# Patient Record
Sex: FEMALE | Race: WHITE | HISPANIC OR LATINO | Employment: UNEMPLOYED | URBAN - METROPOLITAN AREA
[De-identification: names, ages, dates, MRNs, and addresses within clinical notes are randomized per-mention and may not be internally consistent; named-entity substitution may affect disease eponyms.]

---

## 2018-01-21 ENCOUNTER — HOSPITAL ENCOUNTER (EMERGENCY)
Facility: HOSPITAL | Age: 38
Discharge: HOME | End: 2018-01-21
Attending: EMERGENCY MEDICINE

## 2018-01-21 VITALS
RESPIRATION RATE: 18 BRPM | HEART RATE: 62 BPM | DIASTOLIC BLOOD PRESSURE: 64 MMHG | WEIGHT: 143 LBS | HEIGHT: 66 IN | OXYGEN SATURATION: 99 % | SYSTOLIC BLOOD PRESSURE: 113 MMHG | TEMPERATURE: 98 F | BODY MASS INDEX: 22.98 KG/M2

## 2018-01-21 DIAGNOSIS — R09.81 NASAL CONGESTION: Primary | ICD-10-CM

## 2018-01-21 DIAGNOSIS — J06.9 VIRAL URI WITH COUGH: ICD-10-CM

## 2018-01-21 LAB
B-HCG UR QL: NEGATIVE
CTP QC/QA: YES
FLUAV AG SPEC QL IA: NEGATIVE
FLUBV AG SPEC QL IA: NEGATIVE
SPECIMEN SOURCE: NORMAL

## 2018-01-21 PROCEDURE — 63600175 PHARM REV CODE 636 W HCPCS: Performed by: NURSE PRACTITIONER

## 2018-01-21 PROCEDURE — 87400 INFLUENZA A/B EACH AG IA: CPT | Mod: 59

## 2018-01-21 PROCEDURE — 99284 EMERGENCY DEPT VISIT MOD MDM: CPT | Mod: 25

## 2018-01-21 PROCEDURE — 81025 URINE PREGNANCY TEST: CPT | Performed by: EMERGENCY MEDICINE

## 2018-01-21 PROCEDURE — 96372 THER/PROPH/DIAG INJ SC/IM: CPT

## 2018-01-21 RX ORDER — ALBUTEROL SULFATE 90 UG/1
1-2 AEROSOL, METERED RESPIRATORY (INHALATION) EVERY 6 HOURS PRN
Qty: 1 INHALER | Refills: 0 | Status: SHIPPED | OUTPATIENT
Start: 2018-01-21 | End: 2019-01-21

## 2018-01-21 RX ORDER — AZITHROMYCIN 250 MG/1
250 TABLET, FILM COATED ORAL DAILY
Qty: 6 TABLET | Refills: 0 | Status: SHIPPED | OUTPATIENT
Start: 2018-01-21

## 2018-01-21 RX ORDER — METHYLPREDNISOLONE SOD SUCC 125 MG
125 VIAL (EA) INJECTION
Status: COMPLETED | OUTPATIENT
Start: 2018-01-21 | End: 2018-01-21

## 2018-01-21 RX ORDER — PREDNISONE 20 MG/1
40 TABLET ORAL DAILY
Qty: 10 TABLET | Refills: 0 | Status: SHIPPED | OUTPATIENT
Start: 2018-01-21 | End: 2018-01-26

## 2018-01-21 RX ADMIN — METHYLPREDNISOLONE SODIUM SUCCINATE 125 MG: 125 INJECTION, POWDER, FOR SOLUTION INTRAMUSCULAR; INTRAVENOUS at 10:01

## 2018-01-22 NOTE — ED TRIAGE NOTES
Presents to ED with fever, headache, productive cough, body aches, nasal congestion and bilateral ear pain that started this past Wednesday. Reports left sided chest pain x 1 month. 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 Denies SOB at this time.

## 2018-01-22 NOTE — ED PROVIDER NOTES
Encounter Date: 1/21/2018    SCRIBE #1 NOTE: I, Nichol Harmon, am scribing for, and in the presence of,  Opal Hart NP. I have scribed the following portions of the note - Other sections scribed: ROS, HPI.       History     Chief Complaint   Patient presents with    Nasal Congestion     HA, nasal congestion, sinus pressure, fever, chills, sore throat and body aches since Wednesday; took Mucinex at 2pm today     CC: Nasal Congestion    HPI: Patient is a 37 y.o. F with a past medical history of Bronchitis who presents to the ED for evaluation of a 5-day history of URI symptoms, sinus congestion with yellow/green nasal discharge, sore throat, cough, fever, and myalgias. Patient states that her sinus congestion is the most severe symptom and is causing a headache. She attempted treatment with Mucinex with no relief. Patient states that she has been prescribed an Albuterol inhaler in the past with moderate improvement of symptoms. Patient denies emesis, diarrhea, and/or fever.      The history is provided by the patient. No  was used.     Review of patient's allergies indicates:  No Known Allergies  Past Medical History:   Diagnosis Date    Bronchitis      Past Surgical History:   Procedure Laterality Date    VAGINAL DELIVERY      x3     History reviewed. No pertinent family history.  Social History   Substance Use Topics    Smoking status: Never Smoker    Smokeless tobacco: Not on file    Alcohol use No     Review of Systems   Constitutional: Positive for fever.   HENT: Positive for congestion (nasal congestion with yellow/green discharge) and sore throat.    Eyes: Negative for redness.   Respiratory: Positive for cough.    Gastrointestinal: Negative for diarrhea and vomiting.   Genitourinary: Negative for dysuria.   Musculoskeletal: Positive for myalgias.   Skin: Negative for rash.   Neurological: Positive for headaches.       Physical Exam     Initial Vitals [01/21/18 2032]   BP Pulse Resp  Temp SpO2   105/69 82 16 98.1 °F (36.7 °C) 95 %      MAP       81         Physical Exam    Nursing note and vitals reviewed.  Constitutional: Vital signs are normal. She appears well-developed and well-nourished. She is not diaphoretic. She is active and cooperative.  Non-toxic appearance. She appears ill. No distress.   HENT:   Right Ear: Tympanic membrane, external ear and ear canal normal.   Left Ear: Tympanic membrane, external ear and ear canal normal.   Nose: Mucosal edema and rhinorrhea present. No nasal septal hematoma. Right sinus exhibits maxillary sinus tenderness and frontal sinus tenderness. Left sinus exhibits maxillary sinus tenderness and frontal sinus tenderness.   Mouth/Throat: Uvula is midline and mucous membranes are normal. No oral lesions. No trismus in the jaw. No uvula swelling. Posterior oropharyngeal erythema present. No oropharyngeal exudate, posterior oropharyngeal edema or tonsillar abscesses.   No tonsillar enlargement, exudates, abscess, trismus, drooling, cervical lymphadenopathy, voice changes.   Eyes: Conjunctivae and EOM are normal. Pupils are equal, round, and reactive to light.   Neck: Normal range of motion. Neck supple.   No neck meningismus   Cardiovascular: Normal heart sounds. Exam reveals no gallop and no friction rub.    No murmur heard.  Pulmonary/Chest: Effort normal and breath sounds normal. No accessory muscle usage. No tachypnea and no bradypnea. No respiratory distress. She has no wheezes. She has no rhonchi. She has no rales.   Abdominal: Soft.   Lymphadenopathy:     She has no cervical adenopathy.   Neurological: She is alert and oriented to person, place, and time.   Skin: Skin is warm and dry. Capillary refill takes less than 2 seconds.   Psychiatric: She has a normal mood and affect.         ED Course   Procedures  Labs Reviewed   INFLUENZA A AND B ANTIGEN   POCT URINE PREGNANCY                Additional MDM:   Comments: This is an urgent evaluation of a  37-year-old female that presents to the emergency room with cold symptoms for proximally 5 days.  Patient reports associated nasal congestion, headache, sore throat, and cough.  She appears ill and uncomfortable on exam, but is afebrile with normal vital signs.  Exam notable for bilateral boggy turbinates with sinus tenderness to palpation.  Her throat is also erythematous, but she does not meet [facility] criteria for rapid strep testing.  No evidence of peritonsillar abscess, retropharyngeal abscess, airway compromise, respiratory distress, sepsis.  Heart and lung sounds are clear.  She does report a history of severe ALLERGIES.  Symptoms are most likely viral in etiology but given the duration of her symptoms and her clinical presentation, I will cover her for bacterial causes of sinusitis and bronchitis.  Recommended that she continue taking OTC Mucinex.  Will also give steroids, albuterol inhaler for supportive care.  I believe the patient is stable for discharge and outpatient evaluation.  Recommended follow-up with PCP referral for further evaluation of her symptoms in 2-3 days if there is no significant improvement.  She was given strict return precautions for any new or worsening symptoms or concerns..          Scribe Attestation:   Scribe #1: I performed the above scribed service and the documentation accurately describes the services I performed. I attest to the accuracy of the note.    Attending Attestation:           Physician Attestation for Scribe:  Physician Attestation Statement for Scribe #1: I, Opal Hart NP, reviewed documentation, as scribed by Nichol Harmon in my presence, and it is both accurate and complete.                 ED Course      Clinical Impression:   The primary encounter diagnosis was Nasal congestion. A diagnosis of Viral URI with cough was also pertinent to this visit.    Disposition:   Disposition: Discharged  Condition: Stable                        Opal Hart NP  01/22/18 0038

## 2018-01-22 NOTE — DISCHARGE INSTRUCTIONS
Please take the Z-Lico for 5 days for possible bacterial infection, although your symptoms are most likely caused by a virus.    Take prednisone for 5 days to help reduce inflammation in the lungs and sinuses.    Use the albuterol inhaler for chest tightness and wheezing, as needed.    Follow-up with your primary care doctor for further evaluation and management of your symptoms in 2-3 days if there is no significant improvement. If you do not have a regular doctor, you can make an appointment with the primary care doctor listed on these discharge papers, or refer to our community resources page for nearby clinics.    Return to the emergency room for any new or worsening symptoms or concerns.    Por favor tome Z-Lico por 5 días para padmaja posible infección bacteriana, aunque rohit síntomas probablemente ariane causados ??por un virus.    [place] prednisone por 5 días para ayudar a reducir la inflamación en los pulmones y los senos paranasales.    Use el inhalador de albuterol para la opresión en el pecho y las sibilancias, según sea necesario.    Melanie un seguimiento con solitario médico de atención primaria para padmaja evaluación adicional y el manejo de rohit síntomas en 2-3 días si no hay padmaja mejoría significativa. Si no tiene un médico de cabecera, puede hacer padmaja long con el médico de atención primaria que figura en estos documentos de osnali, o consultar nuestra página de recursos de la comunidad para obtener información sobre clínicas cercanas.    Regrese a la jude de emergencias para cualquier síntoma o inquietud nueva o que empeore.